# Patient Record
Sex: MALE | Race: BLACK OR AFRICAN AMERICAN | Employment: UNEMPLOYED | URBAN - METROPOLITAN AREA
[De-identification: names, ages, dates, MRNs, and addresses within clinical notes are randomized per-mention and may not be internally consistent; named-entity substitution may affect disease eponyms.]

---

## 2017-03-14 RX ORDER — TOPIRAMATE 50 MG/1
TABLET, FILM COATED ORAL
Qty: 60 TABLET | Refills: 2 | Status: SHIPPED | OUTPATIENT
Start: 2017-03-14 | End: 2020-06-24

## 2017-03-14 NOTE — TELEPHONE ENCOUNTER
Medication: Topirimate    Date of last refill: 6/25/15 with 2 addt refills  [place]     Last office visit: 07/25/165  Due back to clinic per last office note:  RTN in 3 months by 10/25/16  Date next office visit schedule

## 2018-01-11 NOTE — TELEPHONE ENCOUNTER
Medication: Topirimate 50 mg     Date of last refill: 03/14/17 with 2 addt refills  [place]      Last office visit: 07/25/16  Due back to clinic per last office note:  RTN in 3 months by 10/25/16  Date next office visit

## 2018-01-15 RX ORDER — TOPIRAMATE 50 MG/1
TABLET, FILM COATED ORAL
Qty: 60 TABLET | Refills: 0 | OUTPATIENT
Start: 2018-01-15

## 2018-01-15 NOTE — TELEPHONE ENCOUNTER
Mike called to verify status of refill. Refill denied, pt needs an appt but we are no longer in his ins.

## 2020-06-24 ENCOUNTER — HOSPITAL ENCOUNTER (EMERGENCY)
Facility: HOSPITAL | Age: 25
Discharge: HOME | End: 2020-06-24
Attending: EMERGENCY MEDICINE
Payer: MEDICAID

## 2020-06-24 ENCOUNTER — APPOINTMENT (OUTPATIENT)
Dept: CT IMAGING | Facility: HOSPITAL | Age: 25
End: 2020-06-24
Attending: EMERGENCY MEDICINE
Payer: MEDICAID

## 2020-06-24 VITALS
HEIGHT: 73 IN | BODY MASS INDEX: 24.52 KG/M2 | TEMPERATURE: 100 F | SYSTOLIC BLOOD PRESSURE: 128 MMHG | DIASTOLIC BLOOD PRESSURE: 78 MMHG | RESPIRATION RATE: 16 BRPM | OXYGEN SATURATION: 100 % | WEIGHT: 185 LBS | HEART RATE: 68 BPM
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
## 2020-06-24 DIAGNOSIS — S06.0X0A CONCUSSION WITHOUT LOSS OF CONSCIOUSNESS, INITIAL ENCOUNTER: Primary | ICD-10-CM

## 2020-06-24 PROCEDURE — 99284 EMERGENCY DEPT VISIT MOD MDM: CPT

## 2020-06-24 PROCEDURE — 70450 CT HEAD/BRAIN W/O DYE: CPT | Performed by: EMERGENCY MEDICINE

## 2020-06-24 NOTE — ED NOTES
This patient was endorsed to me by Dr. Markus Hamilton. The head CT was reviewed and there was no evidence of intracranial injury such as intracranial bleed, fractures or masses. His head CT was within normal limits.     Ct Brain Or Head (73992)    Result Date: reviewed that contact sports or return to athletics prior to being cleared medically will put the patient at increased risk for repeated head injury. The patient is not to participate in any contact sports or strenuous activity for 2 weeks.  They are to con
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
